# Patient Record
Sex: FEMALE | Employment: FULL TIME | URBAN - METROPOLITAN AREA
[De-identification: names, ages, dates, MRNs, and addresses within clinical notes are randomized per-mention and may not be internally consistent; named-entity substitution may affect disease eponyms.]

---

## 2022-02-02 ENCOUNTER — THERAPY VISIT (OUTPATIENT)
Dept: PHYSICAL THERAPY | Facility: CLINIC | Age: 42
End: 2022-02-02
Payer: COMMERCIAL

## 2022-02-02 DIAGNOSIS — M25.571 ANKLE PAIN, RIGHT: ICD-10-CM

## 2022-02-02 PROCEDURE — 97161 PT EVAL LOW COMPLEX 20 MIN: CPT | Mod: GP | Performed by: PHYSICAL THERAPIST

## 2022-02-02 PROCEDURE — 97110 THERAPEUTIC EXERCISES: CPT | Mod: GP | Performed by: PHYSICAL THERAPIST

## 2022-02-02 NOTE — PROGRESS NOTES
Physical Therapy Initial Evaluation  Subjective:  Onset of right lateral ankle area pain April 2021.  She notes onset during running.  The pain has progressively spread up to lateral lower leg.  She notes onset within 1/2 to 2 miles of activity.  She has managed symptoms with ice and self massage.  Patient is training for [employer] April 2022.  She will pick at approximately 48 miles per week with a long run of 22.  She is currently running about 35 miles per week with a long run of 12-14.  She is also doing some strength training Wednesdays and Fridays with the day off schedule on Mondays.  She rotates through several types of shoes [employer], central running shoes when the weather dictates, and some saw any likely trainers for some speed workouts.  She reports history of a right femoral neck stress reaction in 2018 or 19, prior to having her 3 children who are 10, 8, and 6, she had right IT band issues in 2008.  She has a general history of repeated right ankle instability unrolling instances that were not significantly functionally limiting.                          Objective:  Standing Alignment:        Lumbar:  Lordosis decr  Pelvic:  ASIS high L          Gait:  Decreased right leg bilaterally mild right pushoff, mild increased right lower extremity IR with pushoff        Flexibility/Screens:   Positive screens:  Lumbar (Moderate loss lumbosacral extension mobility occurring in upper lumbar.)    Lower Extremity:  Decreased left lower extremity flexibility:Piriformis and Hip Flexors    Decreased right lower extremity flexibility:  Hip IR's; Hip ER's; Adductors; Piriformis; Hip Flexors; IT Band; Quadriceps and Soleus  Spine:  Decreased left spine flexibility:  Quadratus Lumborum    Decreased right spine flexibility:  Quadratus Lumborum        Ankle/Foot Evaluation          EDEMA: normal          MOBILITY TESTING:       Talocrural Right: hypomobile  Subtalar Right: hypomobile      FUNCTIONAL TESTS:          Quad:  Single Leg Squat Left: Control is mild loss of control.  Single Leg Squat Right: Control is moderate loss of control             Lumbar/SI Evaluation                  Lumbar Provocation:      Right positive with: Stork w/ext                                      Hip Evaluation  Hip PROM:                      Endfeel: Firm end feel right hip ER moderate loss, firm end feel right hip IR loss                       General     ROS    Assessment/Plan:    Patient is a 41 year old female with right side ankle complaints.    Patient has the following significant findings with corresponding treatment plan.                Diagnosis 1: Right ankle lower leg pain Pain -  hot/cold therapy, manual therapy and self management  Decreased ROM/flexibility - manual therapy and therapeutic exercise  Decreased joint mobility - manual therapy and therapeutic exercise  Impaired gait - gait training  Impaired muscle performance - neuro re-education  Decreased function - therapeutic activities    Therapy Evaluation Codes:   1) History comprised of:   Personal factors that impact the plan of care:      None.    Comorbidity factors that impact the plan of care are:      None.     Medications impacting care: None.  2) Examination of Body Systems comprised of:   Body structures and functions that impact the plan of care:      Ankle, Hip, Lumbar spine and Pelvis.   Activity limitations that impact the plan of care are:      Lifting, Running, Sports, Squatting/kneeling, Stairs and Walking.  3) Clinical presentation characteristics are:   Stable/Uncomplicated.  4) Decision-Making    Low complexity using standardized patient assessment instrument and/or measureable assessment of functional outcome.  Cumulative Therapy Evaluation is: Low complexity.    Previous and current functional limitations:  (See Goal Flow Sheet for this information)    Short term and Long term goals: (See Goal Flow Sheet for this information)     Communication  ability:  Patient appears to be able to clearly communicate and understand verbal and written communication and follow directions correctly.  Treatment Explanation - The following has been discussed with the patient:   RX ordered/plan of care  Anticipated outcomes  Possible risks and side effects  This patient would benefit from PT intervention to resume normal activities.   Rehab potential is excellent.    Frequency:  1 X week, once daily  Duration:  for 6 weeks  Discharge Plan:  Achieve all LTG.  Independent in home treatment program.  Reach maximal therapeutic benefit.    Please refer to the daily flowsheet for treatment today, total treatment time and time spent performing 1:1 timed codes.

## 2022-02-05 ENCOUNTER — HEALTH MAINTENANCE LETTER (OUTPATIENT)
Age: 42
End: 2022-02-05

## 2022-03-01 ENCOUNTER — THERAPY VISIT (OUTPATIENT)
Dept: PHYSICAL THERAPY | Facility: CLINIC | Age: 42
End: 2022-03-01
Payer: COMMERCIAL

## 2022-03-01 DIAGNOSIS — M25.571 ANKLE PAIN, RIGHT: ICD-10-CM

## 2022-03-01 PROCEDURE — 97110 THERAPEUTIC EXERCISES: CPT | Mod: GP | Performed by: PHYSICAL THERAPIST

## 2022-03-01 PROCEDURE — 97140 MANUAL THERAPY 1/> REGIONS: CPT | Mod: GP | Performed by: PHYSICAL THERAPIST

## 2022-10-01 ENCOUNTER — HEALTH MAINTENANCE LETTER (OUTPATIENT)
Age: 42
End: 2022-10-01

## 2023-01-05 NOTE — PROGRESS NOTES
Discharge Note    Progress reporting period is from initial eval/last PN to Mar 1, 2022.     Patient seen for 2 visits.    SUBJECTIVE  Subjective changes noted by patient:  Pt ntes tightness lateral lower leg and post lower R leg after running sitffness, tight at mile 11 but gone at 12 without intervention, She is doing lumbar ext. She feels comfortable iwth her training program and will add TCJ mob in. Avoiding doing over theragun to lower leg  .  Changes in function:  Yes (See Goal flowsheet attached for changes in current functional level)  Adverse reaction to treatment or activity: None    OBJECTIVE  Changes noted in objective findings: L hi PROM ER firm end feel imrpoved to min loss post R, TCJ R with squa mod loss with dorsum foot restriction noted, imrpoved with rx today      ASSESSMENT/PLAN  Diagnosis: R ankle/lower leg pain    DIAGP:  The encounter diagnosis was Ankle pain, right.  Updated problem list and treatment plan:     STG/LTGs have been met or progress has been made towards goals:  Yes, please see goal flowsheet for most current information  Assessment of Progress: current status is unknown.    Last current status:     Self Management Plans:  HEP  I have re-evaluated this patient and find that the nature, scope, duration and intensity of the therapy is appropriate for the medical condition of the patient.  Kaylie continues to require the following intervention to meet STG and LTG's:  HEP.    Recommendations:  Discharge with current home program.  Patient to follow up with MD as needed.    Please refer to the daily flowsheet for treatment today, total treatment time and time spent performing 1:1 timed codes.

## 2023-05-14 ENCOUNTER — HEALTH MAINTENANCE LETTER (OUTPATIENT)
Age: 43
End: 2023-05-14

## 2024-03-03 ENCOUNTER — HEALTH MAINTENANCE LETTER (OUTPATIENT)
Age: 44
End: 2024-03-03

## 2024-07-21 ENCOUNTER — HEALTH MAINTENANCE LETTER (OUTPATIENT)
Age: 44
End: 2024-07-21